# Patient Record
Sex: FEMALE | Race: BLACK OR AFRICAN AMERICAN | NOT HISPANIC OR LATINO | URBAN - METROPOLITAN AREA
[De-identification: names, ages, dates, MRNs, and addresses within clinical notes are randomized per-mention and may not be internally consistent; named-entity substitution may affect disease eponyms.]

---

## 2023-10-16 ENCOUNTER — EMERGENCY (EMERGENCY)
Facility: HOSPITAL | Age: 33
LOS: 0 days | Discharge: HOME | End: 2023-10-16
Attending: EMERGENCY MEDICINE
Payer: COMMERCIAL

## 2023-10-16 VITALS
OXYGEN SATURATION: 100 % | HEART RATE: 93 BPM | RESPIRATION RATE: 18 BRPM | SYSTOLIC BLOOD PRESSURE: 135 MMHG | DIASTOLIC BLOOD PRESSURE: 90 MMHG

## 2023-10-16 VITALS
TEMPERATURE: 98 F | OXYGEN SATURATION: 97 % | DIASTOLIC BLOOD PRESSURE: 93 MMHG | HEART RATE: 117 BPM | SYSTOLIC BLOOD PRESSURE: 155 MMHG | HEIGHT: 63 IN | WEIGHT: 186.07 LBS | RESPIRATION RATE: 17 BRPM

## 2023-10-16 DIAGNOSIS — Z20.822 CONTACT WITH AND (SUSPECTED) EXPOSURE TO COVID-19: ICD-10-CM

## 2023-10-16 DIAGNOSIS — R05.9 COUGH, UNSPECIFIED: ICD-10-CM

## 2023-10-16 DIAGNOSIS — J45.909 UNSPECIFIED ASTHMA, UNCOMPLICATED: ICD-10-CM

## 2023-10-16 DIAGNOSIS — R07.89 OTHER CHEST PAIN: ICD-10-CM

## 2023-10-16 DIAGNOSIS — R06.2 WHEEZING: ICD-10-CM

## 2023-10-16 DIAGNOSIS — I10 ESSENTIAL (PRIMARY) HYPERTENSION: ICD-10-CM

## 2023-10-16 DIAGNOSIS — Z87.42 PERSONAL HISTORY OF OTHER DISEASES OF THE FEMALE GENITAL TRACT: ICD-10-CM

## 2023-10-16 LAB
RAPID RVP RESULT: SIGNIFICANT CHANGE UP
SARS-COV-2 RNA SPEC QL NAA+PROBE: SIGNIFICANT CHANGE UP

## 2023-10-16 PROCEDURE — 71046 X-RAY EXAM CHEST 2 VIEWS: CPT | Mod: 26

## 2023-10-16 PROCEDURE — 99284 EMERGENCY DEPT VISIT MOD MDM: CPT

## 2023-10-16 PROCEDURE — 93010 ELECTROCARDIOGRAM REPORT: CPT | Mod: 76

## 2023-10-16 RX ORDER — IPRATROPIUM/ALBUTEROL SULFATE 18-103MCG
3 AEROSOL WITH ADAPTER (GRAM) INHALATION ONCE
Refills: 0 | Status: COMPLETED | OUTPATIENT
Start: 2023-10-16 | End: 2023-10-16

## 2023-10-16 RX ORDER — DEXAMETHASONE 0.5 MG/5ML
6 ELIXIR ORAL ONCE
Refills: 0 | Status: DISCONTINUED | OUTPATIENT
Start: 2023-10-16 | End: 2023-10-16

## 2023-10-16 RX ORDER — ALBUTEROL 90 UG/1
2 AEROSOL, METERED ORAL
Qty: 1 | Refills: 0
Start: 2023-10-16

## 2023-10-16 RX ORDER — DEXAMETHASONE 0.5 MG/5ML
6 ELIXIR ORAL ONCE
Refills: 0 | Status: COMPLETED | OUTPATIENT
Start: 2023-10-16 | End: 2023-10-16

## 2023-10-16 RX ORDER — ACETAMINOPHEN 500 MG
650 TABLET ORAL ONCE
Refills: 0 | Status: COMPLETED | OUTPATIENT
Start: 2023-10-16 | End: 2023-10-16

## 2023-10-16 RX ADMIN — Medication 650 MILLIGRAM(S): at 12:10

## 2023-10-16 RX ADMIN — Medication 3 MILLILITER(S): at 12:10

## 2023-10-16 RX ADMIN — Medication 6 MILLIGRAM(S): at 12:51

## 2023-10-16 NOTE — ED PROVIDER NOTE - CLINICAL SUMMARY MEDICAL DECISION MAKING FREE TEXT BOX
John: Asthma, cough, chest tightness. Improved w/ albuterol. Lungs clear. No CP. Not likely PE or other VTE: PERC or Wells low score, EKG no e/o R-heart strain. Give nebs, CXR, RVP, steroids. John: Asthma, cough, chest tightness. Improved w/ albuterol. Lungs clear. No CP. Not likely PE or other VTE: PERC or Wells low score, EKG no e/o R-heart strain. Give nebs, CXR, RVP, steroids.    JETT Lopez: 32F with PMH HTN, Asthma, Fibroids who presents to ED with productive cough x 3 days associated with chest tightness x this AM. Pt states symptoms started with nasal congestion/runny nose, sore throat, and now with worsening productive cough over the past day, using Albuterol intermittently with some relief. Pt with history of Asthma, no history of hospital admission or intubation in the past. No known ill contacts, no recent travel, no recent illness. Denies unilateral leg swelling, hemoptysis, recent surgery/trauma, prolonged recent travel or immobilization, prior PE or DVT, history of recent malignancy with treatment, or hormone use. Patient currently afebrile, hemodynamically stable, spO2 100%. Based on history and physical, differentials include but are not limited to viral illness/COVID/Flu, pneumonia, asthma exacerbation. Plan to assess patient for acute pathology as listed above with EKG, CXR, RVP. Will administer medications for symptomatic relief, follow-up on results, and reassess.

## 2023-10-16 NOTE — ED PROVIDER NOTE - PROGRESS NOTE DETAILS
JETT Lopez: Workup Reviewed - CXR without obvious consolidations/infiltrates. Shared Decision Making - Reassessment performed, pt with improvement of symptoms with medications prescribed, pt states her voice has improved and chest tightness has resolved. Patient is medically stable for discharge. Strict return precautions given, discussed red flag signs/symptoms. Patient to follow up with PMD. Patient displays understanding and agreeable with plan, comfortable with discharge plan home. Plan for discharge discussed with Dr. Lopez who agrees with disposition and discharge plan.

## 2023-10-16 NOTE — ED PROVIDER NOTE - PATIENT PORTAL LINK FT
You can access the FollowMyHealth Patient Portal offered by [facility] by registering at the following website: [email or web address]. By joining [facility]’s FollowMyHealth portal, you will also be able to view your health information using other applications (apps) compatible with our system.

## 2023-10-16 NOTE — ED PROVIDER NOTE - OBJECTIVE STATEMENT
32F with PMH HTN, Asthma, Fibroids who presents to ED with productive cough x 3 days associated with chest tightness x this AM. 32F with PMH HTN, Asthma, Fibroids who presents to ED with productive cough x 3 days associated with chest tightness x this AM. Pt states symptoms started with nasal congestion/runny nose, sore throat, and now with worsening productive cough over the past day, using Albuterol intermittently with some relief. Pt with history of Asthma, no history of hospital admission or intubation in the past. No known ill contacts, no recent travel, no recent illness. Denies unilateral leg swelling, hemoptysis, recent surgery/trauma, prolonged recent travel or immobilization, prior PE or DVT, history of recent malignancy with treatment, or hormone use. Denies fever, chills, abdominal pain, N/V/D, dysuria, urinary frequency/urgency, extremity weakness/numbness/tingling, lightheadedness, dizziness, or headaches.

## 2023-10-16 NOTE — ED PROVIDER NOTE - ATTENDING APP SHARED VISIT CONTRIBUTION OF CARE
I performed a face-to-face evaluation of the patient and performed a history and physical examination. I agree with the history and physical examination. If this was a PA visit, I personally saw the patient with the PA and performed a substantive portion of the visit including all aspects of the medical decision making.    Asthma, cough, chest tightness. Improved w/ albuterol. Lungs clear. No CP. Not likely PE or other VTE: PERC or Wells low score, EKG no e/o R-heart strain. Give nebs, CXR, RVP, steroids.

## 2023-10-16 NOTE — ED PROVIDER NOTE - CARE PLAN
Principal Discharge DX:	SOB (shortness of breath)   1 Principal Discharge DX:	SOB (shortness of breath)  Secondary Diagnosis:	Cough  Secondary Diagnosis:	Asthma   Principal Discharge DX:	Chest tightness  Secondary Diagnosis:	Cough  Secondary Diagnosis:	Asthma

## 2023-10-16 NOTE — ED PROVIDER NOTE - CHIEF COMPLAINT
The patient is a 32y Female complaining of chest pain. The patient is a 32y Female complaining of chest tightness.

## 2023-10-16 NOTE — ED PROVIDER NOTE - NSFOLLOWUPINSTRUCTIONS_ED_ALL_ED_FT
Medications   - Albuterol Inhaler, 2 puffs, every 4-6 hours as needed for cough/bronchospasm.    Advance activity as tolerated.  Continue all previously prescribed medications as directed unless otherwise instructed.  Follow up with your primary care physician in 48-72 hours- bring copies of your results.  Return to the ER for worsening or persistent symptoms, and/or ANY NEW OR CONCERNING SYMPTOMS fever, chest pain, shortness of breath/difficulty breathing, palpitations/rapid heart beating, abdominal pain with intractable nausea/vomiting, weakness/numbness/tingling of arms or legs, lightheadedness/dizziness, passing out/fainting, or headaches. If you have issues obtaining follow up, please call: [phone] to obtain a doctor or specialist who takes your insurance in your area.  You may call [phone] to make an appointment with the internal medicine clinic. Medications   - Albuterol Inhaler, 2 puffs, every 4-6 hours as needed for cough/bronchospasm.  - Prednisone 40 mg, 1 tablet, once a day, for 5 days.    Advance activity as tolerated.  Continue all previously prescribed medications as directed unless otherwise instructed.  Follow up with your primary care physician in 48-72 hours- bring copies of your results.  Return to the ER for worsening or persistent symptoms, and/or ANY NEW OR CONCERNING SYMPTOMS fever, chest pain, shortness of breath/difficulty breathing, palpitations/rapid heart beating, abdominal pain with intractable nausea/vomiting, weakness/numbness/tingling of arms or legs, lightheadedness/dizziness, passing out/fainting, or headaches. If you have issues obtaining follow up, please call: [phone] to obtain a doctor or specialist who takes your insurance in your area.  You may call [phone] to make an appointment with the internal medicine clinic.    Shortness of breath is when a person has trouble breathing enough air or when a person feels like she or he is having trouble breathing in enough air. Shortness of breath could be a sign of a medical problem.    Follow these instructions at home:     Pay attention to any changes in your symptoms.  Do not use any products that contain nicotine or tobacco, such as cigarettes, e-cigarettes, and chewing tobacco.   Do not smoke. Smoking is a common cause of shortness of breath. If you need help quitting, ask your health care provider.  Avoid things that can irritate your airways, such as:    Mold.  Dust.  Air pollution.  Chemical fumes.  Things that can cause allergy symptoms (allergens), if you have allergies.  Keep your living space clean and free of mold and dust.  Rest as needed. Slowly return to your usual activities.  Take over-the-counter and prescription medicines only as told by your health care provider. This includes oxygen therapy and inhaled medicines.  Keep all follow-up visits as told by your health care provider. This is important.    Contact a health care provider if:  Your condition does not improve as soon as expected.  You have a hard time doing your normal activities, even after you rest.  You have new symptoms.    Get help right away if:  Your shortness of breath gets worse.  You have shortness of breath when you are resting.  You feel light-headed or you faint.  You have a cough that is not controlled with medicines.  You cough up blood.  You have pain with breathing.  You have pain in your chest, arms, shoulders, or abdomen.  You have a fever.  You cannot walk up stairs or exercise the way that you normally do.    These symptoms may represent a serious problem that is an emergency. Do not wait to see if the symptoms will go away. Get medical help right away. Call your local emergency services (911 in the [place] Do not drive yourself to the hospital.    Summary  Shortness of breath is when a person has trouble breathing enough air. It can be a sign of a medical problem.  Avoid things that irritate your lungs, such as smoking, pollution, mold, and dust.  Pay attention to changes in your symptoms and contact your health care provider if you have a hard time completing daily activities because of shortness of breath.    ADDITIONAL NOTES AND INSTRUCTIONS    Please follow up with your Primary MD in 24-48 hr.  Seek immediate medical care for any new/worsening signs or symptoms.

## 2023-10-16 NOTE — ED ADULT TRIAGE NOTE - CHIEF COMPLAINT QUOTE
pt a&o x4 walk in c.o cough productive x 2 days. shortness of breath, hoarse throat, pt . c.o chest pain. pmh asthma  htn.

## 2023-10-16 NOTE — ED ADULT NURSE NOTE - OBJECTIVE STATEMENT
as per patient " cough, chest pain, nasal congestion and headache, fever the first day and lost my voice."